# Patient Record
Sex: MALE | Race: WHITE | NOT HISPANIC OR LATINO | Employment: OTHER | URBAN - METROPOLITAN AREA
[De-identification: names, ages, dates, MRNs, and addresses within clinical notes are randomized per-mention and may not be internally consistent; named-entity substitution may affect disease eponyms.]

---

## 2019-06-05 ENCOUNTER — TELEPHONE (OUTPATIENT)
Dept: SCHEDULING | Facility: IMAGING CENTER | Age: 84
End: 2019-06-05

## 2019-07-02 ENCOUNTER — TELEPHONE (OUTPATIENT)
Dept: MEDICAL GROUP | Facility: MEDICAL CENTER | Age: 84
End: 2019-07-02

## 2019-07-02 NOTE — TELEPHONE ENCOUNTER
NEW PATIENT VISIT PRE-VISIT PLANNING    1.  EpicCare Patient is checked in Patient Demographics? YES    2.  Immunizations were updated in Epic using WebIZ?: No WebIZ record    3.  Is this appointment scheduled as a Hospital Follow-Up? No    4.  Patient is due for the following Health Maintenance Topics:   There are no preventive care reminders to display for this patient.    R letter faxed to Raine Boston MD.    5. Orders for overdue Health Maintenance topics pended in Pre-Charting? N\A    6.  Reviewed/Updated the following with patient:   •   Preferred Pharmacy? NO       •   Preferred Lab? NO       •   Preferred Communication? NO       •   Allergies? NO       •   Medications? NO       •   Social History? NO       •   Family History (document living status of immediate family members and if + hx of cancer, diabetes, hypertension, hyperlipidemia, heart attack, stroke) NO    7.  Updated Care Team?       •   DME Company (gait device, O2, CPAP, etc.) NO       •   Other Specialists (eye doctor, derm, GYN, cardiology, endo, etc): NO    8.  Patient was NOT informed to arrive 15 min prior to their   scheduled appointment and bring in their medication bottles.

## 2019-07-02 NOTE — LETTER
Critical access hospital  Yandy Shaw MD  [address], [address], [place] [address]  Fax: [phone]   Authorization for Release/Disclosure of   Protected Health Information   Name: JUAN ZEPEDA : 1929 SSN: [id number]   Address: [place] [address] Phone:    [phone] (home)    I authorize the entity listed below to release/disclose the PHI below to:   Critical access hospital/ Yandy Shaw M.D.   Provider or Entity Name:  Raine Boston MD  [facility]   Address   [facility], [facility], [facility]  [address] [place] [address] Phone:  [phone]    Fax:  [phone]   Reason for request: continuity of care   Information to be released:    [  ] LAST COLONOSCOPY,  including any PATH REPORT and follow-up  [  ] LAST FIT/COLOGUARD RESULT [  ] LAST DEXA  [  ] LAST MAMMOGRAM  [  ] LAST PAP  [  ] LAST LABS [  ] RETINA EXAM REPORT  [  ] IMMUNIZATION RECORDS  [ XXX ] Release all info      [  ] Check here and initial the line next to each item to release ALL health information INCLUDING  _____ Care and treatment for drug and / or alcohol abuse  _____ HIV testing, infection status, or AIDS  _____ Genetic Testing    DATES OF SERVICE OR TIME PERIOD TO BE DISCLOSED: _____________  I understand and acknowledge that:  * This Authorization may be revoked at any time by you in writing, except if your health information has already been used or disclosed.  * Your health information that will be used or disclosed as a result of you signing this authorization could be re-disclosed by the recipient. If this occurs, your re-disclosed health information may no longer be protected by State or Federal laws.  * You may refuse to sign this Authorization. Your refusal will not affect your ability to obtain treatment.  * This Authorization becomes effective upon signing and will  on (date) __________.      If no date is indicated, this Authorization will  one (1) year from the signature date.    Name:  Neeraj Cain    Signature: continuity of care   Date:     7/2/2019       PLEASE FAX REQUESTED RECORDS BACK TO: [phone]

## 2019-07-05 ENCOUNTER — OFFICE VISIT (OUTPATIENT)
Dept: CARDIOLOGY | Facility: MEDICAL CENTER | Age: 84
End: 2019-07-05
Payer: MEDICARE

## 2019-07-05 VITALS
HEIGHT: 72 IN | BODY MASS INDEX: 23.84 KG/M2 | HEART RATE: 66 BPM | DIASTOLIC BLOOD PRESSURE: 62 MMHG | OXYGEN SATURATION: 96 % | WEIGHT: 176.04 LBS | SYSTOLIC BLOOD PRESSURE: 130 MMHG

## 2019-07-05 DIAGNOSIS — I10 ESSENTIAL HYPERTENSION, BENIGN: ICD-10-CM

## 2019-07-05 DIAGNOSIS — I34.0 NON-RHEUMATIC MITRAL REGURGITATION: ICD-10-CM

## 2019-07-05 DIAGNOSIS — I48.21 PERMANENT ATRIAL FIBRILLATION (HCC): ICD-10-CM

## 2019-07-05 DIAGNOSIS — I34.0 MITRAL VALVE INSUFFICIENCY, UNSPECIFIED ETIOLOGY: ICD-10-CM

## 2019-07-05 LAB — EKG IMPRESSION: NORMAL

## 2019-07-05 PROCEDURE — 93000 ELECTROCARDIOGRAM COMPLETE: CPT | Performed by: INTERNAL MEDICINE

## 2019-07-05 PROCEDURE — 99204 OFFICE O/P NEW MOD 45 MIN: CPT | Performed by: INTERNAL MEDICINE

## 2019-07-05 RX ORDER — TERAZOSIN 2 MG/1
2 CAPSULE ORAL NIGHTLY
COMMUNITY
End: 2019-07-05 | Stop reason: SDUPTHER

## 2019-07-05 RX ORDER — FLUTICASONE PROPIONATE 50 MCG
1 SPRAY, SUSPENSION (ML) NASAL DAILY
COMMUNITY

## 2019-07-05 RX ORDER — WARFARIN SODIUM 2.5 MG/1
2.5 TABLET ORAL DAILY
COMMUNITY
End: 2019-07-05 | Stop reason: SDUPTHER

## 2019-07-05 RX ORDER — TORSEMIDE 20 MG/1
20 TABLET ORAL DAILY
COMMUNITY
End: 2019-07-05 | Stop reason: SDUPTHER

## 2019-07-05 RX ORDER — FLUTICASONE PROPIONATE 110 UG/1
2 AEROSOL, METERED RESPIRATORY (INHALATION) 2 TIMES DAILY
COMMUNITY
End: 2019-07-18

## 2019-07-05 RX ORDER — LOSARTAN POTASSIUM 100 MG/1
100 TABLET ORAL DAILY
Qty: 90 TAB | Refills: 11 | Status: SHIPPED | OUTPATIENT
Start: 2019-07-05

## 2019-07-05 RX ORDER — TERAZOSIN 2 MG/1
2 CAPSULE ORAL DAILY
Qty: 90 CAP | Refills: 3 | Status: SHIPPED | OUTPATIENT
Start: 2019-07-05

## 2019-07-05 RX ORDER — WARFARIN SODIUM 2.5 MG/1
TABLET ORAL
Qty: 45 TAB | Refills: 11 | Status: SHIPPED | OUTPATIENT
Start: 2019-07-05

## 2019-07-05 RX ORDER — LOSARTAN POTASSIUM 100 MG/1
100 TABLET ORAL DAILY
COMMUNITY
End: 2019-07-05 | Stop reason: SDUPTHER

## 2019-07-05 RX ORDER — TORSEMIDE 20 MG/1
20 TABLET ORAL
Qty: 30 TAB | Refills: 11 | Status: SHIPPED | OUTPATIENT
Start: 2019-07-05 | End: 2019-07-10

## 2019-07-05 NOTE — LETTER
[facility]   [address], [address]  [place] [address]  Phone: [phone]  Fax: [phone]              Neeraj Cain  11/11/1929    Encounter Date: 7/5/2019    Anirudh Hodgson M.D.          PROGRESS NOTE:      Cardiology Initial Consultation Note    Date of note:    7/5/2019    Primary Care Provider: Yandy Shaw M.D.  Referring Provider: No ref. provider found     Patient Name: Neeraj Cain   YOB: 1929  MRN:              [id number]    Chief Complaint: Establish care, hypertension, atrial fibrillation, mitral regurgitation.    Neeraj Cain is a 89 y.o. male  patient presented today to establish care with cardiology.  He recently moved from [facility].  He has history of mitral regurgitation, permanent atrial fibrillation, hypertension.  He also has chronic leg swelling, he was recently started on torsemide which is helping him significantly.  He is moved to [place] to [facility], feels well in general, able to do his daily activities without any limitations.  Denies chest pain or shortness of breath with exertion.  His last echocardiogram was 2 months ago with his prior cardiologist.  According to the patient his mitral regurgitation was just being monitored.    ROS  Positive for fatigue, ringing in ears, hearing loss, joint pains, easy bruising, seasonal allergies, memory loss.  All other systems reviewed and discussed using a comprehensive questionnaire and are negative.     Past medical history, family history, social history, allergies and labs are reviewed and updated as needed as documented below.    Past medical history significant for hypertension, mitral regurgitation, permanent atrial fibrillation    Current Outpatient Prescriptions   Medication Sig Dispense Refill   • fluticasone (FLOVENT HFA) 110 MCG/ACT Aerosol Inhale 2 Puffs by mouth 2 times a day.     • fluticasone (FLONASE) 50 MCG/ACT nasal spray  Spray 1 Spray in nose every day.     • losartan (COZAAR) 100 MG Tab Take 1 Tab by mouth every day. 90 Tab 11   • terazosin (HYTRIN) 2 MG Cap Take 1 Cap by mouth every day. 90 Cap 3   • torsemide (DEMADEX) 20 MG Tab Take 1 Tab by mouth 1 time daily as needed. 30 Tab 11   • warfarin (JANTOVEN) 2.5 MG Tab Take 5mg Sunday, Tuesday, thursday, Saturday. Take 2.5 mg Monday, wednesday and Friday. 45 Tab 11     No current facility-administered medications for this visit.          Allergies   Allergen Reactions   • Azithromycin      Blisters on lips   • Fentanyl      Per pt could not breathe    • Shellfish Allergy      Becomes very sick          No family history on file.      Social History     Social History   • Marital status:      Spouse name: N/A   • Number of children: N/A   • Years of education: N/A     Occupational History   • Not on file.     Social History Main Topics   • Smoking status: Former Smoker   • Smokeless tobacco: Never Used   • Alcohol use Not on file   • Drug use: Unknown   • Sexual activity: Not on file     Other Topics Concern   • Not on file     Social History Narrative   • No narrative on file         Physical Exam:  Ambulatory Vitals  /62 (BP Location: Left arm, Patient Position: Sitting, BP Cuff Size: Adult)   Pulse 66   Ht 1.829 m (6')   Wt 79.9 kg (176 lb 0.6 oz)   SpO2 96%    Oxygen Therapy:  Pulse Oximetry: 96 %  BP Readings from Last 4 Encounters:   07/05/19 130/62       Weight/BMI: Body mass index is 23.87 kg/m².  Wt Readings from Last 4 Encounters:   07/05/19 79.9 kg (176 lb 0.6 oz)       General: Well appearing and in no apparent distress  Head: atrumatic  Eyes: No conjunctival pallor   ENT: normal external appearance of nose and ears  Neck: JVD absent, carotid bruits absent  Lungs: respiratory sounds  normal, additional breath sounds absent  Heart: Irregularly irregular rhythm, 2 x 6 systolic murmur at apex.  Minimal pedal edema is present  Abdomen: soft, non tender, non  distended.  Extremities/MSK: no clubbing, no cyanosis  Neurological: normal orientation, Gait normal   Psychiatric: Appropriate affect, intact judgement and insight  Skin: Warm extremities        Lab Data Review:  Recent laboratory data was normal according to the patient.    EKG shows atrial fibrillation with controlled ventricular response but      Medical Decision Makin-year-old male patient with  1.  Hypertension, well controlled  2.  Mitral regurgitation  3.  Permanent atrial fibrillation.    -Patient heart rate is controlled without any medications.  We will continue to monitor.  Continue Coumadin for anticoagulation, we will make referral for anticoagulation clinic.  -Blood pressure is well controlled continue losartan.  -Advised to take torsemide as needed instead of daily.  -Regarding his mitral regurgitation I am not sure how severe it was.  We will get his recent echocardiogram report, further recommendations after reviewing his echocardiogram.    Return in about 6 months (around 2020).    This note was dictated using Dragon speech recognition software.    Anirudh MORRISSEY  Interventional cardiologist  [facility], [address]ld[address] [facility].  [address] [address]  Phone: [phone]  Fax: [phone]                  No Recipients

## 2019-07-05 NOTE — PROGRESS NOTES
Cardiology Initial Consultation Note    Date of note:    7/5/2019    Primary Care Provider: Yandy Shaw M.D.  Referring Provider: No ref. provider found     Patient Name: Neeraj Cain   YOB: 1929  MRN:              [id number]    Chief Complaint: Establish care, hypertension, atrial fibrillation, mitral regurgitation.    Neeraj Cain is a 89 y.o. male  patient presented today to [facility] with cardiology.  He recently moved from [facility].  He has history of mitral regurgitation, permanent atrial fibrillation, hypertension.  He also has chronic leg swelling, he was recently started on torsemide which is helping him significantly.  He is moved to [facility], feels well in general, able to do his daily activities without any limitations.  Denies chest pain or shortness of breath with exertion.  His last echocardiogram was 2 months ago with his prior cardiologist.  According to the patient his mitral regurgitation was just being monitored.    ROS  Positive for fatigue, ringing in ears, hearing loss, joint pains, easy bruising, seasonal allergies, memory loss.  All other systems reviewed and discussed using a comprehensive questionnaire and are negative.     Past medical history, family history, social history, allergies and labs are reviewed and updated as needed as documented below.    Past medical history significant for hypertension, mitral regurgitation, permanent atrial fibrillation    Current Outpatient Prescriptions   Medication Sig Dispense Refill   • fluticasone (FLOVENT HFA) 110 MCG/ACT Aerosol Inhale 2 Puffs by mouth 2 times a day.     • fluticasone (FLONASE) 50 MCG/ACT nasal spray Spray 1 Spray in nose every day.     • losartan (COZAAR) 100 MG Tab Take 1 Tab by mouth every day. 90 Tab 11   • terazosin (HYTRIN) 2 MG Cap Take 1 Cap by mouth every day. 90 Cap 3   • torsemide (DEMADEX) 20 MG Tab Take 1 Tab by mouth 1 time daily as needed. 30 Tab 11    • warfarin (JANTOVEN) 2.5 MG Tab Take 5mg Sunday, Tuesday, thursday, Saturday. Take 2.5 mg Monday, wednesday and Friday. 45 Tab 11     No current facility-administered medications for this visit.          Allergies   Allergen Reactions   • Azithromycin      Blisters on lips   • Fentanyl      Per pt could not breathe    • Shellfish Allergy      Becomes very sick          No family history on file.      Social History     Social History   • Marital status:      Spouse name: N/A   • Number of children: N/A   • Years of education: N/A     Occupational History   • Not on file.     Social History Main Topics   • Smoking status: Former Smoker   • Smokeless tobacco: Never Used   • Alcohol use Not on file   • Drug use: Unknown   • Sexual activity: Not on file     Other Topics Concern   • Not on file     Social History Narrative   • No narrative on file         Physical Exam:  Ambulatory Vitals  /62 (BP Location: Left arm, Patient Position: Sitting, BP Cuff Size: Adult)   Pulse 66   Ht 1.829 m (6')   Wt 79.9 kg (176 lb 0.6 oz)   SpO2 96%    Oxygen Therapy:  Pulse Oximetry: 96 %  BP Readings from Last 4 Encounters:   07/05/19 130/62       Weight/BMI: Body mass index is 23.87 kg/m².  Wt Readings from Last 4 Encounters:   07/05/19 79.9 kg (176 lb 0.6 oz)       General: Well appearing and in no apparent distress  Head: atrumatic  Eyes: No conjunctival pallor   ENT: normal external appearance of nose and ears  Neck: JVD absent, carotid bruits absent  Lungs: respiratory sounds  normal, additional breath sounds absent  Heart: Irregularly irregular rhythm, 2 x 6 systolic murmur at apex.  Minimal pedal edema is present  Abdomen: soft, non tender, non distended.  Extremities/MSK: no clubbing, no cyanosis  Neurological: normal orientation, Gait normal   Psychiatric: Appropriate affect, intact judgement and insight  Skin: Warm extremities        Lab Data Review:  Recent laboratory data was normal according to the  patient.    EKG shows atrial fibrillation with controlled ventricular response but      Medical Decision Makin-year-old male patient with  1.  Hypertension, well controlled  2.  Mitral regurgitation  3.  Permanent atrial fibrillation.    -Patient heart rate is controlled without any medications.  We will continue to monitor.  Continue Coumadin for anticoagulation, we will make referral for anticoagulation clinic.  -Blood pressure is well controlled continue losartan.  -Advised to take torsemide as needed instead of daily.  -Regarding his mitral regurgitation I am not sure how severe it was.  We will get his recent echocardiogram report, further recommendations after reviewing his echocardiogram.    Return in about 6 months (around 2020).    This note was dictated using Dragon speech recognition software.    Anirudh MORRISSEY  Interventional cardiologist  [facility], [facility]  [address] [address]  Phone: [phone]  Fax: [phone]

## 2019-07-10 ENCOUNTER — OFFICE VISIT (OUTPATIENT)
Dept: MEDICAL GROUP | Facility: MEDICAL CENTER | Age: 84
End: 2019-07-10
Payer: MEDICARE

## 2019-07-10 VITALS
HEIGHT: 72 IN | OXYGEN SATURATION: 95 % | WEIGHT: 179 LBS | HEART RATE: 53 BPM | SYSTOLIC BLOOD PRESSURE: 98 MMHG | RESPIRATION RATE: 15 BRPM | DIASTOLIC BLOOD PRESSURE: 58 MMHG | TEMPERATURE: 98 F | BODY MASS INDEX: 24.24 KG/M2

## 2019-07-10 DIAGNOSIS — I73.00 RAYNAUD'S PHENOMENON WITHOUT GANGRENE: ICD-10-CM

## 2019-07-10 DIAGNOSIS — I48.21 PERMANENT ATRIAL FIBRILLATION (HCC): ICD-10-CM

## 2019-07-10 DIAGNOSIS — I34.0 NON-RHEUMATIC MITRAL REGURGITATION: ICD-10-CM

## 2019-07-10 DIAGNOSIS — N40.0 BENIGN PROSTATIC HYPERPLASIA WITHOUT LOWER URINARY TRACT SYMPTOMS: ICD-10-CM

## 2019-07-10 DIAGNOSIS — Z00.00 PREVENTATIVE HEALTH CARE: ICD-10-CM

## 2019-07-10 DIAGNOSIS — I10 ESSENTIAL HYPERTENSION, BENIGN: ICD-10-CM

## 2019-07-10 PROCEDURE — 8041 PR SCP AHA: Performed by: INTERNAL MEDICINE

## 2019-07-10 PROCEDURE — 99203 OFFICE O/P NEW LOW 30 MIN: CPT | Performed by: INTERNAL MEDICINE

## 2019-07-10 NOTE — PROGRESS NOTES
CC:  There were no encounter diagnoses.    HISTORY OF THE PRESENT ILLNESS: Patient is a 89 y.o. male. This pleasant patient is here today to [facility].    Annual Health Assessment Questions:    1.  Are you currently engaging in any exercise or physical activity? {YES / NO:76211}    2.  How would you describe your mood or emotional well-being today? {ahamood:12416}    3.  Have you had any falls in the last year? {YES / NO:33963}    4.  Have you noticed any problems with your balance or had difficulty walking? {YES / NO:07727}    5.  In the last six months have you experienced any leakage of urine? {YES / NO:18630}    6. DPA/Advanced Directive: {[place] ADVANCED DIRECTIVE:40515}      Allergies: Azithromycin; Fentanyl; and Shellfish allergy    Current Outpatient Prescriptions Ordered in [facility]   Medication Sig Dispense Refill   • fluticasone (FLOVENT HFA) 110 MCG/ACT Aerosol Inhale 2 Puffs by mouth 2 times a day.     • fluticasone (FLONASE) 50 MCG/ACT nasal spray Spray 1 Spray in nose every day.     • losartan (COZAAR) 100 MG Tab Take 1 Tab by mouth every day. 90 Tab 11   • terazosin (HYTRIN) 2 MG Cap Take 1 Cap by mouth every day. 90 Cap 3   • torsemide (DEMADEX) 20 MG Tab Take 1 Tab by mouth 1 time daily as needed. 30 Tab 11   • warfarin (JANTOVEN) 2.5 MG Tab Take 5mg Sunday, Tuesday, thursday, Saturday. Take 2.5 mg Monday, wednesday and Friday. 45 Tab 11     No current [facility]-ordered facility-administered medications on file.        No past medical history on file.    No past surgical history on file.    Social History   Substance Use Topics   • Smoking status: Former Smoker   • Smokeless tobacco: Never Used   • Alcohol use Not on file       Social History     Social History Narrative   • No narrative on file       No family history on file.    ROS:     - Constitutional:*** Negative for fever, chills, unexpected weight change, night sweats    - Eyes:  *** Negative for blurry vision, eye pain, discharge    - ENT: *** Negative  for hearing changes, ear pain, ear discharge, rhinorrhea, sinus congestion, sore throat     - Respiratory:*** Negative for cough, sputum production, chest congestion, dyspnea, wheezing, and crackles.      - Cardiovascular:*** Negative for chest pain, palpitations, orthopnea, and bilateral lower extremity edema.     - Gastrointestinal:*** Negative for heartburn, nausea, vomiting, abdominal pain, hematochezia, melena, diarrhea, constipation, and greasy/foul-smelling stools.     - Genitourinary:*** Negative for dysuria, polyuria, hematuria, pyuria, urinary urgency, and urinary incontinence.     - Musculoskeletal:*** Negative for myalgias, back pain, and joint pain.     - Skin:*** Negative for rash, itching, cyanotic skin color change.     - Neurological:*** Negative for migraines, numbness, ataxia, tremors, vertigo    - Endo:***Negative for polyuria, heat/cold intolerance, excessive thirst    - Hem/lymphatic: ***Negative for easy bruising, blood clots, lymphedema, swollen glands    -Allergic/immun: ***Negative for allergic rhinitis    - Psychiatric/Behavioral: ***Negative for depression, suicidal/homicidal ideation and memory loss.      Exam: There were no vitals taken for this visit. There is no height or weight on file to calculate BMI.    General: Normal appearing. No distress.  EYES: Conjunctiva clear lids without ptosis, pupils equal  EARS: Normal shape and contour   NOSE, THROAT: nasal mucosa benign. oropharynx is without erythema, edema or exudates.   Neck: Supple without LAD. Thyroid is not enlarged.  Pulmonary: Clear to ausculation.  Normal effort. No rales or wheezing.  Cardiovascular: Regular rate and rhythm without significant murmur.   Abdomen: Soft, nontender, nondistended. Normal bowel sounds.  Neurologic: Cranial nerves grossly nonfocal  Lymph: No cervical, supraclavicular nodes palpable  Skin: Warm and dry.  No obvious lesions.  Musculoskeletal: Normal gait. No extremity cyanosis, clubbing, or  edema.  Psych: Normal mood and affect. Alert and oriented x3. Judgment and insight is normal.  ***      Assessment/Plan  There are no diagnoses linked to this encounter.            Please note that this dictation was created using voice recognition software. I have made every reasonable attempt to correct obvious errors, but I expect that there are errors of grammar and possibly content that I did not discover before finalizing the note.

## 2019-07-10 NOTE — PROGRESS NOTES
Subjective:     Neeraj Cain is a 89 y.o. male here today to [facility].    He just moved here from [place].  He says his wife  about a year ago.  Indicates his mood is doing reasonably well.  He is expecting the delivery of his belongings this week, including his blood pressure monitor.    Noted blood pressure today, he denies any symptoms of hypotension, dehydration, dizziness, fatigue.  Cardiology note from 2019 briefly reviewed, they had requested his prior echocardiogram for his history of mitral regurgitation, atrial fibrillation and hypertension.  He has no history of presyncope/syncope, no complaint of chest pain. They also instructed patient to stop his torsemide which he has and since then (1 week) he has had no orthopnea, PND, dyspnea or leg edema.  Continues on losartan 100 mg daily.  Heart rate in the 50s.  No bleeding issues on warfarin, sees anticoagulation clinic next week.    He did have a fall 6 months ago this was mechanical, he thinks he tripped on a cord.  No further falls.  His son Tristan is here today and indicates he will check his father's home to make sure there are no cords, loose rugs, uneven floors or other trip hazards.  He has not had any head trauma.  No blood loss sources.    States he was previously diagnosed with raynaud, his fingers turn white in cold weather.  History of normal ankle-brachial index testing of the legs per patient.  Did have a rheumatology evaluation.  No treatment needed.    History of BPH, absence of symptoms on terazosin 2 mg daily.    He says he is on Flovent inhaler for the past year, he tells me his doctor thought he may have a component of asthma with his thick phlegm from allergies.  He denies any dyspnea, cough, wheeze either then or presently.  He will go off the Flovent inhaler and immediately resume if he does develop in these pulmonary symptoms and let me know.  Has postnasal drainage, uses Flonase as needed, symptoms controlled.    Annual  Health Assessment Questions:     1.  Are you currently engaging in any exercise or physical activity? Not yet she just moved a month ago     2.  How would you describe your mood or emotional well-being today? good    3.  Have you had any falls in the last year? Yes, 6 months    4.  Have you noticed any problems with your balance or had difficulty walking? Yes    5.  In the last six months have you experienced any leakage of urine? No    6. DPA/Advanced Directive: Patient has Living Will, but it is not on file. Instructed to bring in a copy to scan into their chart.     Current medicines (including changes today)  Current Outpatient Prescriptions   Medication Sig Dispense Refill   • fluticasone (FLOVENT HFA) 110 MCG/ACT Aerosol Inhale 2 Puffs by mouth 2 times a day.     • fluticasone (FLONASE) 50 MCG/ACT nasal spray Spray 1 Spray in nose every day.     • losartan (COZAAR) 100 MG Tab Take 1 Tab by mouth every day. 90 Tab 11   • terazosin (HYTRIN) 2 MG Cap Take 1 Cap by mouth every day. 90 Cap 3   • torsemide (DEMADEX) 20 MG Tab Take 1 Tab by mouth 1 time daily as needed. 30 Tab 11   • warfarin (JANTOVEN) 2.5 MG Tab Take 5mg Sunday, Tuesday, thursday, Saturday. Take 2.5 mg Monday, wednesday and Friday. 45 Tab 11     No current facility-administered medications for this visit.        He  has a past medical history of Arrhythmia; Cataract; Hyperlipidemia; and Hypertension.    Azithromycin; Fentanyl; and Shellfish allergy    He  reports that he quit smoking about 26 years ago. His smoking use included Cigarettes. He has a 20.00 pack-year smoking history. He has never used smokeless tobacco. He reports that he drinks alcohol. He reports that he does not use drugs.  Counseling given: Yes      ROS   No chest pain, no shortness of breath, no abdominal pain.  Denies nausea, vomiting, diarrhea, constipation  No fever/chills  No focal weakness  Denies dysuria  No lymphadenopathy  No polyuria     Objective:     Physical Exam:  BP  (!) 98/58 (BP Location: Left arm, Patient Position: Sitting, BP Cuff Size: Adult)   Pulse (!) 53   Temp 36.7 °C (98 °F) (Temporal)   Resp 15   Ht 1.829 m (6')   Wt 81.2 kg (179 lb)   SpO2 95%  Body mass index is 24.28 kg/m².  Constitutional: Alert, no distress.  Skin: Warm, dry, good turgor  Eye: Equal, round and reactive, conjunctiva clear, lids normal.  ENMT: Lips without lesions  Neck: Trachea midline, no masses, no thyromegaly. No cervical or supraclavicular lymphadenopathy.  Respiratory: Unlabored respiratory effort, lungs clear to auscultation, no wheezes, no rhonchi.  Cardiovascular: Irregular, irregular, 2/6 systolic apex murmur, no distal pitting edema.  Abdomen: Soft, non-tender, no masses, no hepatosplenomegaly.  Psych: Alert and oriented x3, normal affect and mood.    Assessment and Plan:     Hypertension: Historically chronic, stable issue. Blood pressure today in clinic is a little low but asymptomatic, he will monitor once daily at home and report back to me by chart email within 1 week.  If blood pressure remains overly controlled we will cut back on his losartan 100 mg daily tablets.  Overall euvolemic.  Torsemide has been discontinued, he is doing well off this medication for the past week.    Atrial fibrillation, rate is controlled, he is on Coumadin with pending anticoagulation clinic next week.  Cardiology has already requested his records to include his echocardiogram.    History of nonrheumatic mitral regurgitation, by exam he is euvolemic and by history asymptomatic.  Again records are pending.    BPH symptoms are stable, chronic and controlled on terazosin 2 mg daily.    Raynauds phenomenon is a chronic, stable condition not requiring any specific treatment.    I do not believe he needs to stay on his Flovent inhaler, he reports a year ago this was started for possible asthma and symptoms related more to phlegm.  He will stop the inhaler and only restart if he develops any symptoms and  call me.  Otherwise his Flonase should be sufficient for any postnasal drainage symptoms.      Discussion today about general wellness and lifestyle habits:    · Engage in regular physical activity and social activities.  · Prevent falls and reduce trip hazards; using ambulatory aides, hearing and vision testing if appropriate.  · Steps to improve urinary incontinence.  · Advanced care planning.     Follow-Up: 6 months or sooner if needed    [place]: 1/7/2009 moderate diverticulosis of sigmoid colon, small internal hemorrhoids, no further colonoscopy needed  p13 4/23/15  p23 12/1/2001  tdap 10/29/15  Zoster 2006    MDX form completed           PLEASE NOTE: This dictation was created using voice recognition software. I have made every reasonable attempt to correct obvious errors, but I expect that there are errors of grammar and possibly content that I did not discover before finalizing the note.

## 2019-07-16 ENCOUNTER — TELEPHONE (OUTPATIENT)
Dept: VASCULAR LAB | Facility: MEDICAL CENTER | Age: 84
End: 2019-07-16

## 2019-07-16 ENCOUNTER — HOSPITAL ENCOUNTER (OUTPATIENT)
Dept: LAB | Facility: MEDICAL CENTER | Age: 84
End: 2019-07-16
Attending: INTERNAL MEDICINE
Payer: MEDICARE

## 2019-07-16 ENCOUNTER — ANTICOAGULATION VISIT (OUTPATIENT)
Dept: VASCULAR LAB | Facility: MEDICAL CENTER | Age: 84
End: 2019-07-16
Attending: INTERNAL MEDICINE
Payer: MEDICARE

## 2019-07-16 VITALS — HEART RATE: 54 BPM | DIASTOLIC BLOOD PRESSURE: 61 MMHG | SYSTOLIC BLOOD PRESSURE: 125 MMHG

## 2019-07-16 DIAGNOSIS — I48.91 ATRIAL FIBRILLATION, UNSPECIFIED TYPE (HCC): ICD-10-CM

## 2019-07-16 DIAGNOSIS — Z00.00 PREVENTATIVE HEALTH CARE: ICD-10-CM

## 2019-07-16 LAB
25(OH)D3 SERPL-MCNC: 23 NG/ML (ref 30–100)
ALBUMIN SERPL BCP-MCNC: 4 G/DL (ref 3.2–4.9)
ALBUMIN/GLOB SERPL: 1.1 G/DL
ALP SERPL-CCNC: 105 U/L (ref 30–99)
ALT SERPL-CCNC: 16 U/L (ref 2–50)
ANION GAP SERPL CALC-SCNC: 7 MMOL/L (ref 0–11.9)
AST SERPL-CCNC: 22 U/L (ref 12–45)
BASOPHILS # BLD AUTO: 0.6 % (ref 0–1.8)
BASOPHILS # BLD: 0.03 K/UL (ref 0–0.12)
BILIRUB SERPL-MCNC: 1.1 MG/DL (ref 0.1–1.5)
BUN SERPL-MCNC: 29 MG/DL (ref 8–22)
CALCIUM SERPL-MCNC: 9.4 MG/DL (ref 8.5–10.5)
CHLORIDE SERPL-SCNC: 112 MMOL/L (ref 96–112)
CHOLEST SERPL-MCNC: 91 MG/DL (ref 100–199)
CO2 SERPL-SCNC: 22 MMOL/L (ref 20–33)
CREAT SERPL-MCNC: 1.23 MG/DL (ref 0.5–1.4)
EOSINOPHIL # BLD AUTO: 0.34 K/UL (ref 0–0.51)
EOSINOPHIL NFR BLD: 7.2 % (ref 0–6.9)
ERYTHROCYTE [DISTWIDTH] IN BLOOD BY AUTOMATED COUNT: 50.2 FL (ref 35.9–50)
FASTING STATUS PATIENT QL REPORTED: NORMAL
GLOBULIN SER CALC-MCNC: 3.7 G/DL (ref 1.9–3.5)
GLUCOSE SERPL-MCNC: 88 MG/DL (ref 65–99)
HCT VFR BLD AUTO: 34.4 % (ref 42–52)
HDLC SERPL-MCNC: 42 MG/DL
HGB BLD-MCNC: 10.8 G/DL (ref 14–18)
IMM GRANULOCYTES # BLD AUTO: 0.01 K/UL (ref 0–0.11)
IMM GRANULOCYTES NFR BLD AUTO: 0.2 % (ref 0–0.9)
INR PPP: 4.4 (ref 2–3.5)
LDLC SERPL CALC-MCNC: 42 MG/DL
LYMPHOCYTES # BLD AUTO: 1.33 K/UL (ref 1–4.8)
LYMPHOCYTES NFR BLD: 28.1 % (ref 22–41)
MCH RBC QN AUTO: 31 PG (ref 27–33)
MCHC RBC AUTO-ENTMCNC: 31.4 G/DL (ref 33.7–35.3)
MCV RBC AUTO: 98.9 FL (ref 81.4–97.8)
MONOCYTES # BLD AUTO: 0.6 K/UL (ref 0–0.85)
MONOCYTES NFR BLD AUTO: 12.7 % (ref 0–13.4)
NEUTROPHILS # BLD AUTO: 2.43 K/UL (ref 1.82–7.42)
NEUTROPHILS NFR BLD: 51.2 % (ref 44–72)
NRBC # BLD AUTO: 0 K/UL
NRBC BLD-RTO: 0 /100 WBC
PLATELET # BLD AUTO: 84 K/UL (ref 164–446)
PMV BLD AUTO: 11.2 FL (ref 9–12.9)
POTASSIUM SERPL-SCNC: 4.1 MMOL/L (ref 3.6–5.5)
PROT SERPL-MCNC: 7.7 G/DL (ref 6–8.2)
RBC # BLD AUTO: 3.48 M/UL (ref 4.7–6.1)
SODIUM SERPL-SCNC: 141 MMOL/L (ref 135–145)
TRIGL SERPL-MCNC: 34 MG/DL (ref 0–149)
WBC # BLD AUTO: 4.7 K/UL (ref 4.8–10.8)

## 2019-07-16 PROCEDURE — 80061 LIPID PANEL: CPT

## 2019-07-16 PROCEDURE — 85610 PROTHROMBIN TIME: CPT

## 2019-07-16 PROCEDURE — 85025 COMPLETE CBC W/AUTO DIFF WBC: CPT

## 2019-07-16 PROCEDURE — 36415 COLL VENOUS BLD VENIPUNCTURE: CPT

## 2019-07-16 PROCEDURE — 99213 OFFICE O/P EST LOW 20 MIN: CPT

## 2019-07-16 PROCEDURE — 80053 COMPREHEN METABOLIC PANEL: CPT

## 2019-07-16 PROCEDURE — 82306 VITAMIN D 25 HYDROXY: CPT

## 2019-07-16 ASSESSMENT — CHA2DS2 SCORE
SEX: MALE
CHF OR LEFT VENTRICULAR DYSFUNCTION: NO
CHA2DS2 VASC SCORE: 3
AGE 75 OR GREATER: YES
AGE 65 TO 74: NO
VASCULAR DISEASE: NO
HYPERTENSION: YES
PRIOR STROKE OR TIA OR THROMBOEMBOLISM: NO
DIABETES: NO

## 2019-07-16 NOTE — TELEPHONE ENCOUNTER
Initial anticoag note and most recent cards note reviewed.  Patient with afib and chads vasc = 3     Pending further recommendations, we will continue with indefinite anticoagulation with warfarin as directed by cards    Will defer all management of rhythm, rate, and other cv issues, aside from anticoagulation, to cards Michael Bloch, MD  Anticoagulation Clinic    Cc:  Dr Hodgson

## 2019-07-16 NOTE — PROGRESS NOTES
Anticoagulation Summary  As of 2019    INR goal:   2.0-3.0   TTR:   --   INR used for dosin.40! (2019)   Warfarin maintenance plan:   3.75 mg (2.5 mg x 1.5) every Mon, Wed, Fri; 5 mg (2.5 mg x 2) all other days   Weekly warfarin total:   31.25 mg   Plan last modified:   Krystyna Reyes (2019)   Next INR check:   2019   Target end date:   Indefinite    Indications    Atrial fibrillation (HCC) [I48.91] [I48.91]             Anticoagulation Episode Summary     INR check location:       Preferred lab:       Send INR reminders to:       Comments:         Anticoagulation Care Providers     Provider Role Specialty Phone number    Anirudh Hodgson M.D. Referring Interventional Cardiology [phone]    [facility] Responsible  [phone]        Anticoagulation Patient Findings  Patient Findings     Negatives:   Signs/symptoms of thrombosis, Signs/symptoms of bleeding, Laboratory test error suspected, Change in health, Change in alcohol use, Change in activity, Upcoming invasive procedure, Emergency department visit, Upcoming dental procedure, Missed doses, Extra doses, Change in medications, Change in diet/appetite, Hospital admission, Bruising, Other complaints          HPI:  Pt is new to [facility], but not new to warfarin. He has hx of atrial fibrillation and has been on warfarin for past 10 years or so. The patient has moved here from [place] and was referred to our clinic by Dr. Hodgson.    Patient has been on warfarin for many years, so just briefly discussed general overview and explained our services and hours of operation. Reminded patient about diet and how foods rich in vitamin K affect the INR.  Discussed monitoring parameters, such as blood in urine, blood in stool, discussed what to do if a dose is missed, or suspected as missed.  Emphasized importance of compliance and consistency including follow up.   Patient contract was signed and scanned into the  patients chart.    Chads-Vasc= 3 (age, Htn)    HAS-BLED= 2 (age, Htn)    Antiplatelet therapy= NO.    Can pt be transitioned to DOAC? Yes, but patient is not interested at this time.    Patient denies any recent changes to medications, other than being taken off of Torsemide. Patient's current warfarin dosing regimen is 3.75mg (2.5mg x 1.5) on Mon, Wed, Fri and 5mg (2.5mg x 2) ROW. Patient reports he has been stable on this dose for quite some time.   Patient has not had INR checked in about 6 weeks due to the move and trying to get settled in here. He has not been eating like usual, but expects now things will start to settle down.    A/P   INR is SUPRA-therapeutic today at 4.4.  Patient instructed to HOLD warfarin x 2 days, then to resume his current dosing regimen. Patient will follow up again in 2 weeks.     Next appt: Tuesday, July 30, 2019  9:15am    Basilio HarrisD

## 2019-07-17 ENCOUNTER — TELEPHONE (OUTPATIENT)
Dept: MEDICAL GROUP | Facility: MEDICAL CENTER | Age: 84
End: 2019-07-17

## 2019-07-17 NOTE — TELEPHONE ENCOUNTER
----- Message from Yandy Shaw M.D. sent at 7/17/2019  8:24 AM PDT -----  Please kindly instruct patient that labs abn, needs soon appt this week.

## 2019-07-17 NOTE — TELEPHONE ENCOUNTER
1. Caller Name: Neeraj Edward Healion      Call Back Number: [phone] (home)         Patient approves a detailed voicemail message: N\A    Called patient and made appointment.

## 2019-07-18 ENCOUNTER — OFFICE VISIT (OUTPATIENT)
Dept: MEDICAL GROUP | Facility: MEDICAL CENTER | Age: 84
End: 2019-07-18
Payer: MEDICARE

## 2019-07-18 VITALS
SYSTOLIC BLOOD PRESSURE: 102 MMHG | HEIGHT: 72 IN | TEMPERATURE: 97.4 F | HEART RATE: 52 BPM | WEIGHT: 180.2 LBS | RESPIRATION RATE: 17 BRPM | DIASTOLIC BLOOD PRESSURE: 50 MMHG | BODY MASS INDEX: 24.41 KG/M2 | OXYGEN SATURATION: 97 %

## 2019-07-18 DIAGNOSIS — D61.818 PANCYTOPENIA (HCC): ICD-10-CM

## 2019-07-18 DIAGNOSIS — G47.33 OSA ON CPAP: ICD-10-CM

## 2019-07-18 DIAGNOSIS — E55.9 VITAMIN D DEFICIENCY: ICD-10-CM

## 2019-07-18 PROCEDURE — 99214 OFFICE O/P EST MOD 30 MIN: CPT | Performed by: INTERNAL MEDICINE

## 2019-07-18 RX ORDER — ERGOCALCIFEROL 1.25 MG/1
50000 CAPSULE ORAL
Qty: 12 CAP | Refills: 1 | Status: SHIPPED | OUTPATIENT
Start: 2019-07-18

## 2019-07-18 NOTE — TELEPHONE ENCOUNTER
ESTABLISHED PATIENT PRE-VISIT PLANNING     Patient was NOT contacted to complete PVP.     Note: Patient will not be contacted if there is no indication to call.     1.  Reviewed notes from the last few office visits within the medical group: Yes    2.  If any orders were placed at last visit or intended to be done for this visit (i.e. 6 mos follow-up), do we have Results/Consult Notes?        •  Labs - Labs ordered, completed on 07/16/2019 and results are in chart.   Note: If patient appointment is for lab review and patient did not complete labs, check with provider if OK to reschedule patient until labs completed.       •  Imaging - Imaging was not ordered at last office visit.       •  Referrals - No referrals were ordered at last office visit.    3. Is this appointment scheduled as a Hospital Follow-Up? No    4.  Immunizations were updated in Epic using WebIZ?: No WebIZ record          5.  Patient is due for the following Health Maintenance Topics:   Health Maintenance Due   Topic Date Due   • Annual Wellness Visit  11/11/1929   • IMM DTaP/Tdap/Td Vaccine (1 - Tdap) 11/11/1948   • IMM ZOSTER VACCINES (1 of 2) 11/11/1979   • IMM PNEUMOCOCCAL 65+ (ADULT) LOW/MEDIUM RISK SERIES (1 of 2 - PCV13) 11/11/1994         6. Orders for overdue Health Maintenance topics pended in Pre-Charting? NO    7.  AHA (MDX) form printed for Provider? No, already completed    8.  Patient was NOT informed to arrive 15 min prior to their scheduled appointment and bring in their medication bottles.

## 2019-07-18 NOTE — PROGRESS NOTES
CC:  Diagnoses of LOLITA on CPAP, Vitamin D deficiency, and Pancytopenia (HCC) were pertinent to this visit.    HISTORY OF THE PRESENT ILLNESS: Patient is a 89 y.o. male. This pleasant patient is here today to f/u abn labs. Here w/son.    We reviewed his labs from 7/16/2019 showing pancytopenia with WBC 4.7, hemoglobin 10.8, platelets 84.  The differential just showed some minimally elevated eosinophils.    GFR same day at 55.  BUN slightly elevated 29 and fasting state.  Creatinine normal at 1.23 and normal electrolytes otherwise.    Labs 7/16/2019 low vitamin D at 23.    Patient indicates he does have history of sleep apnea diagnosed about 12 years ago, he has been maintained on CPAP and has followed annually with his pulmonologist.  Since moving here he will need to reestablish in case he needs supplies.  CPAP machine is working well currently.    Allergies: Azithromycin; Fentanyl; and Shellfish allergy    Current Outpatient Prescriptions Ordered in [facility]   Medication Sig Dispense Refill   • ergocalciferol (DRISDOL) 09772 UNIT capsule Take 1 Cap by mouth every 7 days. 12 Cap 1   • fluticasone (FLONASE) 50 MCG/ACT nasal spray Spray 1 Spray in nose every day.     • losartan (COZAAR) 100 MG Tab Take 1 Tab by mouth every day. 90 Tab 11   • terazosin (HYTRIN) 2 MG Cap Take 1 Cap by mouth every day. 90 Cap 3   • warfarin (JANTOVEN) 2.5 MG Tab Take 5mg Sunday, Tuesday, thursday, Saturday. Take 2.5 mg Monday, wednesday and Friday. 45 Tab 11     No current [facility]-ordered facility-administered medications on file.        Past Medical History:   Diagnosis Date   • Arrhythmia    • Cataract    • Hyperlipidemia    • Hypertension        Past Surgical History:   Procedure Laterality Date   • ATHROPLASTY     • EYE SURGERY     • HERNIA REPAIR     • LAMINOTOMY     • VASECTOMY         Social History   Substance Use Topics   • Smoking status: Former Smoker     Packs/day: 0.50     Years: 40.00     Types: Cigarettes     Quit date: 7/1/1993    • Smokeless tobacco: Never Used      Comment: none   • Alcohol use Yes     6 Glasses of wine, 5 Shots of liquor per week      Comment: max one a day       Social History     Social History Narrative   • No narrative on file       Family History   Problem Relation Age of Onset   • No Known Problems Mother    • No Known Problems Father        ROS:     - Constitutional: Negative for fever, chills, unexpected weight change, night sweats    - Eyes:   Negative for blurry vision, eye pain, discharge    - ENT:  Negative for hearing changes, ear pain, ear discharge, rhinorrhea, sinus congestion, sore throat     - Respiratory: Negative for cough, sputum production, chest congestion, dyspnea, wheezing, and crackles.      - Cardiovascular: Negative for chest pain, palpitations, orthopnea, and bilateral lower extremity edema.     - Gastrointestinal: Negative for heartburn, nausea, vomiting, abdominal pain, hematochezia, melena, diarrhea, constipation, and greasy/foul-smelling stools.     - Genitourinary: Negative for dysuria, polyuria, hematuria, pyuria, urinary urgency, and urinary incontinence.     - Musculoskeletal: Negative for myalgias, back pain, and joint pain.     - Skin: Negative for rash, itching, cyanotic skin color change.     - Neurological: Negative for migraines, numbness, ataxia, tremors, vertigo    - Endo:Negative for polyuria, heat/cold intolerance, excessive thirst    - Hem/lymphatic: Negative for easy bruising, blood clots, lymphedema, swollen glands    -Allergic/immun: Negative for allergic rhinitis    - Psychiatric/Behavioral: Negative for depression, suicidal/homicidal ideation and memory loss.      Exam: /50 (BP Location: Left arm, Patient Position: Sitting, BP Cuff Size: Adult)   Pulse (!) 52   Temp 36.3 °C (97.4 °F) (Temporal)   Resp 17   Ht 1.829 m (6')   Wt 81.7 kg (180 lb 3.2 oz)   SpO2 97%  Body mass index is 24.44 kg/m².    General: Normal appearing. No distress.  EYES: Conjunctiva  clear lids without ptosis, pupils equal  EARS: Normal shape and contour   Skin: Warm and dry.  No obvious lesions.  Musculoskeletal: Normal gait. No extremity cyanosis, clubbing, or edema.  Psych: Normal mood and affect. Alert and oriented x3. Judgment and insight is normal.    Assessment/Plan  1. LOLITA on CPAP  - REFERRAL TO SLEEP STUDIES    2. Vitamin D deficiency  - ergocalciferol (DRISDOL) 67179 UNIT capsule; Take 1 Cap by mouth every 7 days.  Dispense: 12 Cap; Refill: 1    3. Pancytopenia (HCC)  New issue  - REFERRAL TO HEMATOLOGY ONCOLOGY Referral to? [facility]      rtc as planned [place]        Please note that this dictation was created using voice recognition software. I have made every reasonable attempt to correct obvious errors, but I expect that there are errors of grammar and possibly content that I did not discover before finalizing the note.

## 2019-07-19 LAB — INR BLD: 4.4 (ref 0.9–1.2)

## 2019-07-22 ENCOUNTER — PATIENT MESSAGE (OUTPATIENT)
Dept: CARDIOLOGY | Facility: MEDICAL CENTER | Age: 84
End: 2019-07-22

## 2019-07-22 RX ORDER — TORSEMIDE 20 MG/1
20 TABLET ORAL
Qty: 30 TAB | Refills: 3 | Status: SHIPPED | OUTPATIENT
Start: 2019-07-22

## 2019-07-23 NOTE — PATIENT COMMUNICATION
Per Dr. Hodgson's last office visit note 7/05/19 pt may take toresmide as needed for swelling.     Called and s/w pt over the phone notifying him of this and discussed proper way to take PRN medications. Pt will continue to monitor swelling and weight gain and call/ mychart if needing to take torsemide often. He appreciated call.

## 2019-07-28 ENCOUNTER — PATIENT MESSAGE (OUTPATIENT)
Dept: CARDIOLOGY | Facility: MEDICAL CENTER | Age: 84
End: 2019-07-28

## 2019-07-30 ENCOUNTER — ANTICOAGULATION VISIT (OUTPATIENT)
Dept: VASCULAR LAB | Facility: MEDICAL CENTER | Age: 84
End: 2019-07-30
Attending: INTERNAL MEDICINE
Payer: MEDICARE

## 2019-07-30 VITALS — HEART RATE: 56 BPM | DIASTOLIC BLOOD PRESSURE: 67 MMHG | SYSTOLIC BLOOD PRESSURE: 144 MMHG

## 2019-07-30 DIAGNOSIS — Z79.01 CHRONIC ANTICOAGULATION: ICD-10-CM

## 2019-07-30 LAB — INR PPP: 2.6 (ref 2–3.5)

## 2019-07-30 PROCEDURE — 85610 PROTHROMBIN TIME: CPT

## 2019-07-30 PROCEDURE — 99211 OFF/OP EST MAY X REQ PHY/QHP: CPT

## 2019-07-30 NOTE — PROGRESS NOTES
Anticoagulation Summary  As of 2019    INR goal:   2.0-3.0   TTR:   77.8 % (4 d)   INR used for dosin.60 (2019)   Warfarin maintenance plan:   3.75 mg (2.5 mg x 1.5) every Mon, Wed, Fri; 5 mg (2.5 mg x 2) all other days   Weekly warfarin total:   31.25 mg   Plan last modified:   Krystyna Reyes (2019)   Next INR check:   2019   Target end date:   Indefinite    Indications    Atrial fibrillation (HCC) [I48.91] [I48.91]             Anticoagulation Episode Summary     INR check location:       Preferred lab:       Send INR reminders to:       Comments:         Anticoagulation Care Providers     Provider Role Specialty Phone number    Anirudh Hodgson M.D. Referring Interventional Cardiology [phone]    [facility] Responsible  [phone]        Anticoagulation Patient Findings      HPI:  Neeraj Sheldon Ant seen in clinic today, on anticoagulation therapy with warfarin for Afib.   Changes to current medical/health status since last appt: none  Denies signs/symptoms of bleeding and/or thrombosis since the last appt.    Denies any interval changes to diet  Denies any interval changes to medications since last appt.   Denies any complications or cost restrictions with current therapy.   BP recorded in vitals.  BP elevated on two separate readings, continue to monitor.  His BP machine is still in transit from the move, will have pt take home BP when his machine arrives with his moving company.   Confirmed dosing regimen.     A/P   INR  therapeutic.   Pt is to continue with current warfarin dosing regimen.     Follow up appointment in 2 week(s).    Luis Pacheco, PharmD

## 2019-08-01 LAB — INR BLD: 2.6 (ref 0.9–1.2)

## 2019-08-02 NOTE — PROGRESS NOTES
"08/02/19    Subjective    Chief Complaint: 89 male referred for evaluation of pancytopenia    HPI:  Recent CBC showed mild pancytopenia. Patient just moved here from [place], [place]. Was never told of abnormal lab in [place] but remembers some \"L\"s on his results. He's a retired , last job was with the [place] [employer]. Feels well. Other medical problems include AF and MV regurgitation. He is on coumadin.    ROS: - Negative for SOB, cough, chest pain, GI or  sx's. Had recent \"double\" hernia repair.    PMH:    Allergies   Allergen Reactions   • Azithromycin      Blisters on lips   • Fentanyl      Per pt could not breathe    • Shellfish Allergy      Becomes very sick        Medications:    Current Outpatient Medications on File Prior to Visit   Medication Sig Dispense Refill   • torsemide (DEMADEX) 20 MG Tab Take 1 Tab by mouth 1 time daily as needed (take for swelling and/or weight gain). 30 Tab 3   • ergocalciferol (DRISDOL) 21913 UNIT capsule Take 1 Cap by mouth every 7 days. 12 Cap 1   • fluticasone (FLONASE) 50 MCG/ACT nasal spray Spray 1 Spray in nose every day.     • losartan (COZAAR) 100 MG Tab Take 1 Tab by mouth every day. 90 Tab 11   • terazosin (HYTRIN) 2 MG Cap Take 1 Cap by mouth every day. 90 Cap 3   • warfarin (JANTOVEN) 2.5 MG Tab Take 5mg Sunday, Tuesday, thursday, Saturday. Take 2.5 mg Monday, wednesday and Friday. 45 Tab 11     No current facility-administered medications on file prior to visit.    Surgeries - R THR, Back surgery    FH: - English/[place] descent  F - + 85 - poor circulation  M - + - 86 MI  B - + unk cause  3 C - A&W    SH:    S - O  ETOH - rare    Objective    Vitals:    /64 (BP Location: Right arm, Patient Position: Sitting, BP Cuff Size: Adult)   Pulse (!) 57   Temp 37.1 °C (98.8 °F) (Temporal)   Resp 16   Ht 1.829 m (6')   Wt 82.7 kg (182 lb 5.1 oz)   SpO2 98%   BMI 24.73 kg/m²     Physical Exam: Very young appearing male NAD    HEENT - PERRL  Neck - " supple  Node - none  Chest - clear  Breasts - nl male  COR - slow irreg rhythm with 1-2/6 systolic murmur  Abd - No palpable liver, spleen. Definite hardness in RLQ compared to left. ? Mass vs post-op changes form hernia?  Ext - No edema  Skin - no rash or jaundice    Labs:  Results for JUAN ZEPEDA   Ref. Range 7/16/2019 10:19   WBC Latest Ref Range: 4.8 - 10.8 K/uL 4.7 (L)   RBC Latest Ref Range: 4.70 - 6.10 M/uL 3.48 (L)   Hemoglobin Latest Ref Range: 14.0 - 18.0 g/dL 10.8 (L)   Hematocrit Latest Ref Range: 42.0 - 52.0 % 34.4 (L)   MCV Latest Ref Range: 81.4 - 97.8 fL 98.9 (H)   MCH Latest Ref Range: 27.0 - 33.0 pg 31.0   MCHC Latest Ref Range: 33.7 - 35.3 g/dL 31.4 (L)   RDW Latest Ref Range: 35.9 - 50.0 fL 50.2 (H)   Platelet Count Latest Ref Range: 164 - 446 K/uL 84 (L)   MPV Latest Ref Range: 9.0 - 12.9 fL 11.2   Neutrophils-Polys Latest Ref Range: 44.00 - 72.00 % 51.20   Neutrophils (Absolute) Latest Ref Range: 1.82 - 7.42 K/uL 2.43   Lymphocytes Latest Ref Range: 22.00 - 41.00 % 28.10   Lymphs (Absolute) Latest Ref Range: 1.00 - 4.80 K/uL 1.33   Monocytes Latest Ref Range: 0.00 - 13.40 % 12.70   Monos (Absolute) Latest Ref Range: 0.00 - 0.85 K/uL 0.60   Eosinophils Latest Ref Range: 0.00 - 6.90 % 7.20 (H)       Assessment    Imp:    Visit Diagnosis:    1. Pancytopenia (HCC)  VITAMIN B12    FOLATE    LDH    CBC WITH DIFFERENTIAL   2. Abdominal mass, right lower quadrant     3. Intra-abdominal and pelvic swelling, mass and lump, unspecified site  CT-ABDOMEN WITH & W/O, PELVIS WITH         Plan:    RTC after lab and CT results available      Nick Nolasco M.D.

## 2019-08-05 ENCOUNTER — OFFICE VISIT (OUTPATIENT)
Dept: HEMATOLOGY ONCOLOGY | Facility: MEDICAL CENTER | Age: 84
End: 2019-08-05
Payer: MEDICARE

## 2019-08-05 ENCOUNTER — HOSPITAL ENCOUNTER (OUTPATIENT)
Dept: LAB | Facility: MEDICAL CENTER | Age: 84
End: 2019-08-05
Attending: INTERNAL MEDICINE
Payer: MEDICARE

## 2019-08-05 VITALS
SYSTOLIC BLOOD PRESSURE: 108 MMHG | RESPIRATION RATE: 16 BRPM | WEIGHT: 182.32 LBS | HEART RATE: 57 BPM | TEMPERATURE: 98.8 F | OXYGEN SATURATION: 98 % | DIASTOLIC BLOOD PRESSURE: 64 MMHG | HEIGHT: 72 IN | BODY MASS INDEX: 24.69 KG/M2

## 2019-08-05 DIAGNOSIS — R19.03 ABDOMINAL MASS, RIGHT LOWER QUADRANT: ICD-10-CM

## 2019-08-05 DIAGNOSIS — D61.818 PANCYTOPENIA (HCC): ICD-10-CM

## 2019-08-05 DIAGNOSIS — N40.0 BENIGN PROSTATIC HYPERPLASIA WITHOUT LOWER URINARY TRACT SYMPTOMS: ICD-10-CM

## 2019-08-05 DIAGNOSIS — I34.0 NON-RHEUMATIC MITRAL REGURGITATION: ICD-10-CM

## 2019-08-05 DIAGNOSIS — R19.00 INTRA-ABDOMINAL AND PELVIC SWELLING, MASS AND LUMP, UNSPECIFIED SITE: ICD-10-CM

## 2019-08-05 DIAGNOSIS — I48.20 CHRONIC ATRIAL FIBRILLATION (HCC): ICD-10-CM

## 2019-08-05 LAB
BASOPHILS # BLD AUTO: 0.6 % (ref 0–1.8)
BASOPHILS # BLD: 0.03 K/UL (ref 0–0.12)
EOSINOPHIL # BLD AUTO: 0.44 K/UL (ref 0–0.51)
EOSINOPHIL NFR BLD: 8.3 % (ref 0–6.9)
ERYTHROCYTE [DISTWIDTH] IN BLOOD BY AUTOMATED COUNT: 52.7 FL (ref 35.9–50)
FOLATE SERPL-MCNC: 20.7 NG/ML
HCT VFR BLD AUTO: 35.9 % (ref 42–52)
HGB BLD-MCNC: 10.9 G/DL (ref 14–18)
IMM GRANULOCYTES # BLD AUTO: 0.01 K/UL (ref 0–0.11)
IMM GRANULOCYTES NFR BLD AUTO: 0.2 % (ref 0–0.9)
LDH SERPL L TO P-CCNC: 199 U/L (ref 107–266)
LYMPHOCYTES # BLD AUTO: 1.35 K/UL (ref 1–4.8)
LYMPHOCYTES NFR BLD: 25.4 % (ref 22–41)
MCH RBC QN AUTO: 30.4 PG (ref 27–33)
MCHC RBC AUTO-ENTMCNC: 30.4 G/DL (ref 33.7–35.3)
MCV RBC AUTO: 100.3 FL (ref 81.4–97.8)
MONOCYTES # BLD AUTO: 0.64 K/UL (ref 0–0.85)
MONOCYTES NFR BLD AUTO: 12 % (ref 0–13.4)
NEUTROPHILS # BLD AUTO: 2.85 K/UL (ref 1.82–7.42)
NEUTROPHILS NFR BLD: 53.5 % (ref 44–72)
NRBC # BLD AUTO: 0 K/UL
NRBC BLD-RTO: 0 /100 WBC
PLATELET # BLD AUTO: 96 K/UL (ref 164–446)
PMV BLD AUTO: 11.3 FL (ref 9–12.9)
RBC # BLD AUTO: 3.58 M/UL (ref 4.7–6.1)
VIT B12 SERPL-MCNC: 475 PG/ML (ref 211–911)
WBC # BLD AUTO: 5.3 K/UL (ref 4.8–10.8)

## 2019-08-05 PROCEDURE — 83615 LACTATE (LD) (LDH) ENZYME: CPT

## 2019-08-05 PROCEDURE — 82607 VITAMIN B-12: CPT

## 2019-08-05 PROCEDURE — 85025 COMPLETE CBC W/AUTO DIFF WBC: CPT

## 2019-08-05 PROCEDURE — 82746 ASSAY OF FOLIC ACID SERUM: CPT

## 2019-08-05 PROCEDURE — 99203 OFFICE O/P NEW LOW 30 MIN: CPT | Performed by: INTERNAL MEDICINE

## 2019-08-05 PROCEDURE — 36415 COLL VENOUS BLD VENIPUNCTURE: CPT

## 2019-08-05 ASSESSMENT — PAIN SCALES - GENERAL: PAINLEVEL: NO PAIN

## 2019-08-12 ENCOUNTER — HOSPITAL ENCOUNTER (OUTPATIENT)
Dept: RADIOLOGY | Facility: MEDICAL CENTER | Age: 84
End: 2019-08-12
Attending: INTERNAL MEDICINE
Payer: MEDICARE

## 2019-08-12 DIAGNOSIS — R19.00 INTRA-ABDOMINAL AND PELVIC SWELLING, MASS AND LUMP, UNSPECIFIED SITE: ICD-10-CM

## 2019-08-12 PROCEDURE — 700117 HCHG RX CONTRAST REV CODE 255: Performed by: INTERNAL MEDICINE

## 2019-08-12 PROCEDURE — 74177 CT ABD & PELVIS W/CONTRAST: CPT

## 2019-08-12 RX ADMIN — IOHEXOL 100 ML: 350 INJECTION, SOLUTION INTRAVENOUS at 10:22

## 2019-08-12 RX ADMIN — IOHEXOL 25 ML: 240 INJECTION, SOLUTION INTRATHECAL; INTRAVASCULAR; INTRAVENOUS; ORAL at 10:23

## 2019-08-13 ENCOUNTER — ANTICOAGULATION VISIT (OUTPATIENT)
Dept: VASCULAR LAB | Facility: MEDICAL CENTER | Age: 84
End: 2019-08-13
Attending: INTERNAL MEDICINE
Payer: MEDICARE

## 2019-08-13 DIAGNOSIS — Z79.01 CHRONIC ANTICOAGULATION: ICD-10-CM

## 2019-08-13 LAB
INR BLD: 2.8 (ref 0.9–1.2)
INR PPP: 2.8 (ref 2–3.5)

## 2019-08-13 PROCEDURE — 85610 PROTHROMBIN TIME: CPT

## 2019-08-13 PROCEDURE — 99211 OFF/OP EST MAY X REQ PHY/QHP: CPT

## 2019-08-13 NOTE — PROGRESS NOTES
OP Anticoagulation Service Note    Date: 2019    Anticoagulation Summary  As of 2019    INR goal:   2.0-3.0   TTR:   95.1 % (2.6 wk)   INR used for dosin.80 (2019)   Warfarin maintenance plan:   3.75 mg (2.5 mg x 1.5) every Mon, Wed, Fri; 5 mg (2.5 mg x 2) all other days   Weekly warfarin total:   31.25 mg   Plan last modified:   Krystyna Reyes (2019)   Next INR check:   9/10/2019   Target end date:   Indefinite    Indications    Atrial fibrillation (HCC) [I48.91] [I48.91]             Anticoagulation Episode Summary     INR check location:       Preferred lab:       Send INR reminders to:       Comments:         Anticoagulation Care Providers     Provider Role Specialty Phone number    Anirudh Hodgson M.D. Referring Interventional Cardiology [phone]    [facility] Responsible  [phone]        Anticoagulation Patient Findings      HPI:   Neeraj Sheldon Ant seen in clinic today, they are here today for a INR check on anticoagulation therapy     The reason for today's visit is to prevent morbidity and mortality from a blood clot or stroke and to reduce the risk of bleeding while on a anticoagulant.     Dose the patient in the medical group have a [facility] primary care provider and proper insurance?  Yandy Shaw M.D.  [id number]  [facility] [id number] [phone]      Additional education provided today regarding reducing bleed risk and dietary constraints:  About how vitamin K and foods work with warfarin and the bleeding risk on a anticoagulant     Any upcoming procedures:   none    Confirmed warfarin dosing regimen  Interval Changes with foods rich in vitamin K: No  Interval Changes in ETOH:   No  Interval Changes in smoking status:  No  Interval Changes in medication:  No   Cost restriction:  No  S/S of bleeding or bruising:  No  Signs/symptoms  thrombosis since the last appt:  No  Bleed risk is:  moderate,       Assessment:    INR  therapeutic.     Medications reviewed and updated--    Plan:  Continue weekly warfarin dose as noted      Follow up:  Follow up appointment in 4 week(s)       Other info:  Pt educated to contact our clinic with any changes in medications or s/s of bleeding or thrombosis    CHEST guidelines recommend frequent INR monitoring at regular intervals (a few days up to a max of 12 weeks) to ensure they are on the proper dose of warfarin and not having any complications from therapy.  INRs can dramatically change over a short time period due to diet, medications, and medical conditions.     Josh Conte M.S., Pharm.D., [facility], [facility]    This note was created using voice recognition software (Dragon). The accuracy of the dictation is limited by the abilities of the software. I have reviewed the note prior to signing, however some errors in grammar and context are still possible. If you have any questions related to this note please do not hesitate to contact our office.

## 2019-08-20 PROBLEM — D61.818 PANCYTOPENIA (HCC): Status: ACTIVE | Noted: 2019-08-20

## 2019-08-20 NOTE — PROGRESS NOTES
"08/20/19    Subjective    Chief Complaint:  89 male f/u mild pancytopenia    HPI:  See note of 8/2/19. Retired  from [place], [place]. Mild panyctopenia and macrocytosis. B12, Folate, retic ct, CT abdomen all (-). Does have minimally elevated gamma globulin. Completing w/u would require an SPEP which will probably be normal, and a BMX. Although he is 89 he is a very young 89, still very active.     ROS:    PMH:    Allergies   Allergen Reactions   • Azithromycin      Blisters on lips   • Fentanyl      Per pt could not breathe    • Shellfish Allergy      Becomes very sick        Medications:    Current Outpatient Medications on File Prior to Visit   Medication Sig Dispense Refill   • torsemide (DEMADEX) 20 MG Tab Take 1 Tab by mouth 1 time daily as needed (take for swelling and/or weight gain). 30 Tab 3   • ergocalciferol (DRISDOL) 16642 UNIT capsule Take 1 Cap by mouth every 7 days. 12 Cap 1   • fluticasone (FLONASE) 50 MCG/ACT nasal spray Spray 1 Spray in nose every day.     • losartan (COZAAR) 100 MG Tab Take 1 Tab by mouth every day. 90 Tab 11   • terazosin (HYTRIN) 2 MG Cap Take 1 Cap by mouth every day. 90 Cap 3   • warfarin (JANTOVEN) 2.5 MG Tab Take 5mg Sunday, Tuesday, thursday, Saturday. Take 2.5 mg Monday, wednesday and Friday. 45 Tab 11     No current facility-administered medications on file prior to visit.          Objective    Vitals:    /60 (BP Location: Right arm, Patient Position: Sitting, BP Cuff Size: Adult)   Pulse 62   Temp 36.6 °C (97.9 °F) (Temporal)   Resp 16   Ht 1.85 m (6' 0.84\")   Wt 81.7 kg (180 lb 1.9 oz)   SpO2 96%   BMI 23.87 kg/m²     Physical Exam: Not examined this visit        Labs:    Results for DUANE JUAN YBARRA (MRN [id number])    Ref. Range 8/5/2019 13:48   WBC Latest Ref Range: 4.8 - 10.8 K/uL 5.3   RBC Latest Ref Range: 4.70 - 6.10 M/uL 3.58 (L)   Hemoglobin Latest Ref Range: 14.0 - 18.0 g/dL 10.9 (L)   Hematocrit Latest Ref Range: 42.0 - 52.0 % " 35.9 (L)   MCV Latest Ref Range: 81.4 - 97.8 fL 100.3 (H)   MCH Latest Ref Range: 27.0 - 33.0 pg 30.4   MCHC Latest Ref Range: 33.7 - 35.3 g/dL 30.4 (L)   RDW Latest Ref Range: 35.9 - 50.0 fL 52.7 (H)   Platelet Count Latest Ref Range: 164 - 446 K/uL 96 (L)   MPV Latest Ref Range: 9.0 - 12.9 fL 11.3   Neutrophils-Polys Latest Ref Range: 44.00 - 72.00 % 53.50   Neutrophils (Absolute) Latest Ref Range: 1.82 - 7.42 K/uL 2.85   Lymphocytes Latest Ref Range: 22.00 - 41.00 % 25.40   Lymphs (Absolute) Latest Ref Range: 1.00 - 4.80 K/uL 1.35   Monocytes Latest Ref Range: 0.00 - 13.40 % 12.00      Ref. Range 8/5/2019 13:48   Folate -Folic Acid Latest Ref Range: >4.0 ng/mL 20.7   Vitamin B12 -True Cobalamin Latest Ref Range: 211 - 911 pg/mL 475     Assessment    Imp:    Visit Diagnosis:    1. Pancytopenia (HCC)  IMMUNOELECTROPHORESIS, SERUM    SPEP W/REFLEX TO ALBARO HART G, M         Plan:    He is moving to [place]. Will obtain the SPEP - if negative only other test would be a BMX    Nick Nolasco M.D.

## 2019-08-22 ENCOUNTER — OFFICE VISIT (OUTPATIENT)
Dept: HEMATOLOGY ONCOLOGY | Facility: MEDICAL CENTER | Age: 84
End: 2019-08-22
Payer: MEDICARE

## 2019-08-22 ENCOUNTER — HOSPITAL ENCOUNTER (OUTPATIENT)
Dept: LAB | Facility: MEDICAL CENTER | Age: 84
End: 2019-08-22
Attending: INTERNAL MEDICINE
Payer: MEDICARE

## 2019-08-22 VITALS
WEIGHT: 180.12 LBS | HEART RATE: 62 BPM | HEIGHT: 73 IN | RESPIRATION RATE: 16 BRPM | BODY MASS INDEX: 23.87 KG/M2 | OXYGEN SATURATION: 96 % | DIASTOLIC BLOOD PRESSURE: 60 MMHG | TEMPERATURE: 97.9 F | SYSTOLIC BLOOD PRESSURE: 130 MMHG

## 2019-08-22 DIAGNOSIS — D61.818 PANCYTOPENIA (HCC): ICD-10-CM

## 2019-08-22 PROCEDURE — 84155 ASSAY OF PROTEIN SERUM: CPT | Mod: 91

## 2019-08-22 PROCEDURE — 86334 IMMUNOFIX E-PHORESIS SERUM: CPT

## 2019-08-22 PROCEDURE — 82784 ASSAY IGA/IGD/IGG/IGM EACH: CPT

## 2019-08-22 PROCEDURE — 36415 COLL VENOUS BLD VENIPUNCTURE: CPT

## 2019-08-22 PROCEDURE — 84165 PROTEIN E-PHORESIS SERUM: CPT | Mod: 91

## 2019-08-22 PROCEDURE — 99212 OFFICE O/P EST SF 10 MIN: CPT | Performed by: INTERNAL MEDICINE

## 2019-08-22 ASSESSMENT — PAIN SCALES - GENERAL: PAINLEVEL: NO PAIN

## 2019-08-27 LAB
ALBUMIN SERPL ELPH-MCNC: 3.94 G/DL (ref 3.75–5.01)
ALPHA1 GLOB SERPL ELPH-MCNC: 0.27 G/DL (ref 0.19–0.46)
ALPHA2 GLOB SERPL ELPH-MCNC: 0.65 G/DL (ref 0.48–1.05)
B-GLOBULIN SERPL ELPH-MCNC: 1.03 G/DL (ref 0.48–1.1)
GAMMA GLOB SERPL ELPH-MCNC: 1.71 G/DL (ref 0.62–1.51)
IGA SERPL-MCNC: 579 MG/DL (ref 68–408)
IGG SERPL-MCNC: 1670 MG/DL (ref 768–1632)
IGM SERPL-MCNC: 232 MG/DL (ref 35–263)
INTERPRETATION SERPL IFE-IMP: ABNORMAL
INTERPRETATION SERPL IFE-IMP: ABNORMAL
PATHOLOGY STUDY: ABNORMAL
PROT SERPL-MCNC: 7.6 G/DL (ref 6.3–8.2)

## 2019-12-27 ENCOUNTER — ANTICOAGULATION MONITORING (OUTPATIENT)
Dept: VASCULAR LAB | Facility: MEDICAL CENTER | Age: 84
End: 2019-12-27

## 2019-12-27 NOTE — PROGRESS NOTES
Anticoagulation clinic    regarding getting INR done ASAP for anticoagulation clinic.     I attempted to call this patient unfortunately I was not able to speak with them or leave a voice message.  We will need to call back and attempt to reach them at a later time.      Josh Conte, StevenD

## 2020-01-23 ENCOUNTER — ANTICOAGULATION MONITORING (OUTPATIENT)
Dept: VASCULAR LAB | Facility: MEDICAL CENTER | Age: 85
End: 2020-01-23

## 2020-01-23 NOTE — PROGRESS NOTES
Discharged from [facility].  Pt managed by PCP in [facility], Clinical Pharmacist, CDE, CACP

## 2021-01-11 DIAGNOSIS — Z23 NEED FOR VACCINATION: ICD-10-CM

## 2022-11-23 ENCOUNTER — TELEPHONE (OUTPATIENT)
Dept: MEDICAL GROUP | Facility: MEDICAL CENTER | Age: 87
End: 2022-11-23

## 2022-11-23 NOTE — TELEPHONE ENCOUNTER
Phone Number Called: [phone] (home)       Call outcome: Did not leave a detailed message. Requested patient to call back.    Message: VMB not set up yet no VM left